# Patient Record
(demographics unavailable — no encounter records)

---

## 2025-04-24 NOTE — PHYSICAL EXAM
[Well Developed] : well developed [No Acute Distress] : no acute distress [Normal Conjunctiva] : normal conjunctiva [No Carotid Bruit] : no carotid bruit [Normal S1, S2] : normal S1, S2 [No Murmur] : no murmur [No Rub] : no rub [No Gallop] : no gallop [Clear Lung Fields] : clear lung fields [No Respiratory Distress] : no respiratory distress  [Soft] : abdomen soft [Non Tender] : non-tender [Normal Bowel Sounds] : normal bowel sounds [Normal Gait] : normal gait [No Edema] : no edema [No Rash] : no rash [Moves all extremities] : moves all extremities [Normal Speech] : normal speech [Alert and Oriented] : alert and oriented [Normal memory] : normal memory

## 2025-04-29 NOTE — HISTORY OF PRESENT ILLNESS
[FreeTextEntry1] : M with PMH of HLD who presents to the office today for follow up after hospitalization for STEMI 1/19-1/21/2022. He presented to the ED with exertional chest pain and diaphoresis partially relieved by nitro, positive troponins, diagnosis of inferior wall STEMI. He underwent a LHC with ALEX X2 to the RCA. Reports feeling well today. Denies chest pain, SOB at rest, CANTU, palpitations, dizziness, fatigue, syncope, near syncope and LE edema. States that he gets slightly lightheaded when standing quickly but it resolves once he starts walking. Right radial access site is clean, dry and intact with no edema, erythema, ecchymosis or hematoma noted. He has been compliant with medications. Plans to follow up with his cardiologist Dr. Juan Carlos Lara going forward.   4/28/2022 Patient here for follow up. He wants to follow up with Dr. Kirby going forward. He states he is feeling well. he denies chest pain, dyspnea, near syncope or syncope. He does admit mild aches to left and right chest, lasts seconds, which he attributes to MS pain secondary to lifting weights. He exercises on the elliptical x 1 hour without chest pain, dyspnea, or lightheadedness. He does have intermittent lightheadedness with position changes. He reports compliance with his meds. Denies bleeding on DAPT.   1/5/2023 Patient presents for follow up. He states he is feeling well. He denies any symptoms of chest pain, dyspnea, orthopnea, palpitations, fatigue, edema, near syncope or syncope. He reports he is compliant with his medications. Denies bleeding episodes of DAPT. he is very active and exercises at the gym daily with out chest pain or SOB. Denies smoking cigarettes but does have a cigar couple times/year. Drinks etoh socially on the weekends.  4/2024- ~1 hour on elliptical.   8/2/2024 Returns for preprocedural cardiac risk assessment for upcoming Colonoscopy to be performed by [facility] on August 13, 2024. Reports of feeling good. He uses elliptical machine for exercise for about an hour every other day and weights on other days. Denies chest pain, shortness of breath, palpitations, syncope or near syncope, orthopnea and PND. Compliant with medications. No activity intolerance. No leg edema.  4/2025- Labs 4/8/25 wnl, LDL 49. Very active. Left sided lower rib cage pain.

## 2025-04-29 NOTE — DISCUSSION/SUMMARY
[Patient] : the patient [Risks] : risks [Benefits] : benefits [Alternatives] : alternatives [FreeTextEntry1] : 1. CAD/ STEMI s/p DESx2 1/2022- no exertional CP/SOB, no EKG changes. Patient with very high exercise tolerance. No symptoms. No stress testing at this time. TTE to assess LV function.  2. HTN- BP stable off medication 3. HLD- on Zetia 10mg daily, recent LDL 4/2025 49 4. Follow up in 1 year [EKG obtained to assist in diagnosis and management of assessed problem(s)] : EKG obtained to assist in diagnosis and management of assessed problem(s)

## 2025-04-29 NOTE — CARDIOLOGY SUMMARY
[de-identified] : 4/29/025: Sinus Bradycardia -First degree A-V block   -RBBB, LAFB   8/2023- Sinus Bradycardia, RBBB, LAFB

## 2025-04-29 NOTE — CARDIOLOGY SUMMARY
[de-identified] : 4/29/025: Sinus Bradycardia -First degree A-V block   -RBBB, LAFB   8/2023- Sinus Bradycardia, RBBB, LAFB

## 2025-04-29 NOTE — HISTORY OF PRESENT ILLNESS
[FreeTextEntry1] : M with PMH of HLD who presents to the office today for follow up after hospitalization for STEMI 1/19-1/21/2022. He presented to the ED with exertional chest pain and diaphoresis partially relieved by nitro, positive troponins, diagnosis of inferior wall STEMI. He underwent a LHC with ALEX X2 to the RCA. Reports feeling well today. Denies chest pain, SOB at rest, CANTU, palpitations, dizziness, fatigue, syncope, near syncope and LE edema. States that he gets slightly lightheaded when standing quickly but it resolves once he starts walking. Right radial access site is clean, dry and intact with no edema, erythema, ecchymosis or hematoma noted. He has been compliant with medications. Plans to follow up with his cardiologist Dr. Juan Carlos Lara going forward.   4/28/2022 Patient here for follow up. He wants to follow up with Dr. Kirby going forward. He states he is feeling well. he denies chest pain, dyspnea, near syncope or syncope. He does admit mild aches to left and right chest, lasts seconds, which he attributes to MS pain secondary to lifting weights. He exercises on the elliptical x 1 hour without chest pain, dyspnea, or lightheadedness. He does have intermittent lightheadedness with position changes. He reports compliance with his meds. Denies bleeding on DAPT.   1/5/2023 Patient presents for follow up. He states he is feeling well. He denies any symptoms of chest pain, dyspnea, orthopnea, palpitations, fatigue, edema, near syncope or syncope. He reports he is compliant with his medications. Denies bleeding episodes of DAPT. he is very active and exercises at the gym daily with out chest pain or SOB. Denies smoking cigarettes but does have a cigar couple times/year. Drinks etoh socially on the weekends.  4/2024- ~1 hour on elliptical.   8/2/2024 Returns for preprocedural cardiac risk assessment for upcoming Colonoscopy to be performed by [facility] on August 13, 2024. Reports of feeling good. He uses elliptical machine for exercise for about an hour every other day and weights on other days. Denies chest pain, shortness of breath, palpitations, syncope or near syncope, orthopnea and PND. Compliant with medications. No activity intolerance. No leg edema.  4/2025- Labs 4/8/25 wnl, LDL 49. Very active. Left sided lower rib cage pain.